# Patient Record
Sex: FEMALE | Race: BLACK OR AFRICAN AMERICAN
[De-identification: names, ages, dates, MRNs, and addresses within clinical notes are randomized per-mention and may not be internally consistent; named-entity substitution may affect disease eponyms.]

---

## 2018-11-17 ENCOUNTER — HOSPITAL ENCOUNTER (EMERGENCY)
Dept: HOSPITAL 53 - M ED | Age: 23
Discharge: HOME | End: 2018-11-17
Payer: COMMERCIAL

## 2018-11-17 DIAGNOSIS — S16.9XXA: Primary | ICD-10-CM
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
## 2018-11-17 DIAGNOSIS — F17.210: ICD-10-CM
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
## 2018-11-17 PROCEDURE — 70490 CT SOFT TISSUE NECK W/O DYE: CPT

## 2018-11-17 RX ADMIN — IBUPROFEN 1 MG: 600 TABLET, FILM COATED ORAL at 12:15

## 2019-03-25 ENCOUNTER — HOSPITAL ENCOUNTER (EMERGENCY)
Dept: HOSPITAL 53 - M ED | Age: 24
Discharge: HOME | End: 2019-03-25
Payer: COMMERCIAL

## 2019-03-25 VITALS — HEIGHT: 66 IN | WEIGHT: 156.33 LBS | BODY MASS INDEX: 25.12 KG/M2

## 2019-03-25 VITALS — SYSTOLIC BLOOD PRESSURE: 99 MMHG | DIASTOLIC BLOOD PRESSURE: 61 MMHG

## 2019-03-25 DIAGNOSIS — R91.1: ICD-10-CM

## 2019-03-25 DIAGNOSIS — R06.9: Primary | ICD-10-CM

## 2019-03-25 DIAGNOSIS — R07.89: ICD-10-CM

## 2019-03-25 DIAGNOSIS — M41.124: ICD-10-CM

## 2019-03-25 DIAGNOSIS — I49.9: ICD-10-CM

## 2019-03-25 LAB
B-HCG SERPL QL: NEGATIVE
BUN SERPL-MCNC: 15 MG/DL (ref 7–18)
CALCIUM SERPL-MCNC: 8.5 MG/DL (ref 8.5–10.1)
CHLORIDE SERPL-SCNC: 107 MEQ/L (ref 98–107)
CO2 SERPL-SCNC: 28 MEQ/L (ref 21–32)
CREAT SERPL-MCNC: 0.84 MG/DL (ref 0.55–1.3)
GFR SERPL CREATININE-BSD FRML MDRD: > 60 ML/MIN/{1.73_M2} (ref 60–?)
GLUCOSE SERPL-MCNC: 92 MG/DL (ref 70–100)
POTASSIUM SERPL-SCNC: 4.4 MEQ/L (ref 3.5–5.1)
SODIUM SERPL-SCNC: 139 MEQ/L (ref 136–145)

## 2019-03-25 PROCEDURE — 93005 ELECTROCARDIOGRAM TRACING: CPT

## 2019-03-25 PROCEDURE — 80048 BASIC METABOLIC PNL TOTAL CA: CPT

## 2019-03-25 PROCEDURE — 84703 CHORIONIC GONADOTROPIN ASSAY: CPT

## 2019-03-25 PROCEDURE — 99284 EMERGENCY DEPT VISIT MOD MDM: CPT

## 2019-03-25 PROCEDURE — 85379 FIBRIN DEGRADATION QUANT: CPT

## 2019-03-25 PROCEDURE — 71275 CT ANGIOGRAPHY CHEST: CPT

## 2019-03-25 PROCEDURE — 71046 X-RAY EXAM CHEST 2 VIEWS: CPT

## 2019-03-25 NOTE — ECGEPIP
Stationary ECG Study

                           [facility] - ED

                                       

                                       Test Date:    2019

Pat Name:     MARY HOLT         Department:   

Patient ID:   [id number]                 Room:         -

Gender:       F                        Technician:   

:          1995               Requested By: Twin KIRAN

Order Number: [id number]     Reading MD:   Rachel Alegre

                                 Measurements

Intervals                              Axis          

Rate:         64                       P:            58

GA:           167                      QRS:          37

QRSD:         82                       T:            17

QT:           396                                    

QTc:          409                                    

                           Interpretive Statements

SINUS RHYTHM WITH SINUS ARRHYTHMIA

NSTTW ABNORMALITY

NO PRIOR FOR COMPARISON

Electronically Signed On 3- 21:08:21 EDT by Rachel Alegre

## 2019-03-25 NOTE — REP
Chest x-ray:  Two views.

 

History:  Cough.  Right upper chest pain.

 

Findings:  There is a moderate dextroconvex thoracic scoliotic curve.  EKG

monitoring electrodes are seen.  The lungs are symmetrically aerated and free of

infiltrate.  Pleural angles are sharp.  Heart size is normal.

 

Impression:

 

Scoliosis.  Otherwise no active disease.

 

 

Electronically Signed by

Yosvany Jean Baptiste MD 03/25/2019 09:00 A

## 2019-03-25 NOTE — REP
CT pulmonary angiogram:  With IV contrast.

 

History: Rule out pulmonary embolus.

 

Comparison studies: No comparison study.

 

Contrast dose:  75 ML of Isovue 370 are administered intravenously.

 

CT technique:  Helical scanning is acquired and overlapping 1.5 mm and contiguous

3 mm axial images are reformatted.  In addition, maximum intensity projection and

multiplanar re-formation images are generated in sagittal and coronal imaging

projections.

 

CT pulmonary angiographic findings: There is good opacification of the pulmonary

arterial tree.  There is no CT evidence of pulmonary embolism.  There is early

homogeneous enhancement of the aorta which is normal in caliber.  No evidence of

dissection or aneurysm.  No mediastinal mass or adenopathy is seen.  No pleural

or pericardial effusion is noted.  There is a dextroconvex thoracic scoliotic

curve.  Maximal intensity projection images show no evidence of filling defect or

vessel cutoff.  The lung fields are clear.  There is one 4 mm noncalcified

pulmonary nodule in the right middle lobe seen on page 46 of 88 in series 402 of

today's study.  This is better displayed on coronal and sagittal MPR images.  No

other pulmonary nodule is seen.  No bony destructive lesion is seen.  Visualized

upper abdominal structures are unremarkable.

 

Impression:

 

No CT evidence of pulmonary embolus.  Dextroconvex thoracic scoliosis.  Single 4

mm noncalcified right middle lobe pulmonary nodule.  No acute disease.

 

 

Electronically Signed by

Yosvany Jean Baptiste MD 03/25/2019 12:09 P

## 2019-03-27 NOTE — ED PDOC
Post-Departure Follow-Up


ft drum fp faxed formal report of cta chest for fu mlg Lundborg-Gray,Maja MD          Mar 27, 2019 13:04